# Patient Record
Sex: FEMALE | Race: BLACK OR AFRICAN AMERICAN
[De-identification: names, ages, dates, MRNs, and addresses within clinical notes are randomized per-mention and may not be internally consistent; named-entity substitution may affect disease eponyms.]

---

## 2018-01-26 ENCOUNTER — HOSPITAL ENCOUNTER (OUTPATIENT)
Dept: HOSPITAL 74 - JER | Age: 33
Setting detail: OBSERVATION
LOS: 2 days | Discharge: HOME | End: 2018-01-28
Attending: INTERNAL MEDICINE | Admitting: INTERNAL MEDICINE
Payer: COMMERCIAL

## 2018-01-26 VITALS — BODY MASS INDEX: 36.5 KG/M2
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
## 2018-01-26 DIAGNOSIS — S42.301A: Primary | ICD-10-CM

## 2018-01-26 DIAGNOSIS — R73.03: ICD-10-CM

## 2018-01-26 DIAGNOSIS — E66.9: ICD-10-CM

## 2018-01-26 DIAGNOSIS — D72.829: ICD-10-CM
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
## 2018-01-26 DIAGNOSIS — S00.81XA: ICD-10-CM

## 2018-01-26 DIAGNOSIS — S09.90XA: ICD-10-CM
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
## 2018-01-26 PROCEDURE — G0378 HOSPITAL OBSERVATION PER HR: HCPCS
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
## 2018-01-27 LAB
ALBUMIN SERPL-MCNC: 3.7 G/DL (ref 3.4–5)
ALP SERPL-CCNC: 125 U/L (ref 45–117)
ALT SERPL-CCNC: 26 U/L (ref 12–78)
ANION GAP SERPL CALC-SCNC: 10 MMOL/L (ref 8–16)
APPEARANCE UR: (no result)
AST SERPL-CCNC: 18 U/L (ref 15–37)
BASOPHILS # BLD: 0.2 % (ref 0–2)
BILIRUB SERPL-MCNC: 0.2 MG/DL (ref 0.2–1)
BILIRUB UR STRIP.AUTO-MCNC: NEGATIVE MG/DL
BUN SERPL-MCNC: 16 MG/DL (ref 7–18)
CALCIUM SERPL-MCNC: 8.9 MG/DL (ref 8.5–10.1)
CHLORIDE SERPL-SCNC: 102 MMOL/L (ref 98–107)
CO2 SERPL-SCNC: 26 MMOL/L (ref 21–32)
COLOR UR: (no result)
CREAT SERPL-MCNC: 0.7 MG/DL (ref 0.55–1.02)
DEPRECATED RDW RBC AUTO: 14.4 % (ref 11.6–15.6)
EOSINOPHIL # BLD: 0 % (ref 0–4.5)
EPITH CASTS URNS QL MICRO: (no result) /HPF
GLUCOSE SERPL-MCNC: 110 MG/DL (ref 74–106)
HCT VFR BLD CALC: 39.6 % (ref 32.4–45.2)
HGB BLD-MCNC: 12.5 GM/DL (ref 10.7–15.3)
INR BLD: 0.99 (ref 0.82–1.09)
KETONES UR QL STRIP: NEGATIVE
LEUKOCYTE ESTERASE UR QL STRIP.AUTO: (no result)
LYMPHOCYTES # BLD: 6.3 % (ref 8–40)
MCH RBC QN AUTO: 24.7 PG (ref 25.7–33.7)
MCHC RBC AUTO-ENTMCNC: 31.5 G/DL (ref 32–36)
MCV RBC: 78.4 FL (ref 80–96)
MONOCYTES # BLD AUTO: 3.7 % (ref 3.8–10.2)
MUCOUS THREADS URNS QL MICRO: (no result)
NEUTROPHILS # BLD: 89.8 % (ref 42.8–82.8)
NITRITE UR QL STRIP: NEGATIVE
PH UR: 6 [PH] (ref 5–8)
PLATELET # BLD AUTO: 253 K/MM3 (ref 134–434)
PMV BLD: 10.6 FL (ref 7.5–11.1)
POTASSIUM SERPLBLD-SCNC: 4.2 MMOL/L (ref 3.5–5.1)
PROT SERPL-MCNC: 7.8 G/DL (ref 6.4–8.2)
PROT UR QL STRIP: (no result)
PROT UR QL STRIP: (no result)
PT PNL PPP: 11.2 SEC (ref 9.98–11.88)
RBC # BLD AUTO: 5.06 M/MM3 (ref 3.6–5.2)
RBC # UR STRIP: (no result) /UL
SODIUM SERPL-SCNC: 138 MMOL/L (ref 136–145)
SP GR UR: 1.03 (ref 1–1.03)
UROBILINOGEN UR STRIP-MCNC: NEGATIVE MG/DL (ref 0.2–1)
WBC # BLD AUTO: 12.5 K/MM3 (ref 4–10)

## 2018-01-27 PROCEDURE — 3E0234Z INTRODUCTION OF SERUM, TOXOID AND VACCINE INTO MUSCLE, PERCUTANEOUS APPROACH: ICD-10-PCS | Performed by: INTERNAL MEDICINE

## 2018-01-27 PROCEDURE — 2W38X1Z IMMOBILIZATION OF RIGHT UPPER EXTREMITY USING SPLINT: ICD-10-PCS | Performed by: INTERNAL MEDICINE

## 2018-01-27 RX ADMIN — ACETAMINOPHEN PRN MG: 325 TABLET ORAL at 11:39

## 2018-01-27 RX ADMIN — PANTOPRAZOLE SODIUM SCH MG: 40 TABLET, DELAYED RELEASE ORAL at 17:38

## 2018-01-27 RX ADMIN — CLARITHROMYCIN SCH MG: 500 TABLET ORAL at 22:50

## 2018-01-27 RX ADMIN — AMOXICILLIN SCH MG: 500 CAPSULE ORAL at 22:50

## 2018-01-27 RX ADMIN — ENOXAPARIN SODIUM SCH MG: 40 INJECTION SUBCUTANEOUS at 17:38
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
## 2018-01-27 NOTE — CONSULT
Consult - text type





- Consultation


Consultation Note: 





FULL CONSULT DICTATED





IMp; TRANSVERSE RIGHT HUMERAL SHAFT FX WITH MILD NUMBNESS IN THE RADIAL NERVE 

DISTRIBUTION


PLAN: CLOSED TREATMENT IN SUGAR TONG SPLINT, DC HOME IN AM A=ND F/U MY OFFICE X 

1 WEEK
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
## 2018-01-28 VITALS — SYSTOLIC BLOOD PRESSURE: 105 MMHG | DIASTOLIC BLOOD PRESSURE: 56 MMHG | HEART RATE: 88 BPM

## 2018-01-28 VITALS — TEMPERATURE: 98.5 F

## 2018-01-28 LAB
ANION GAP SERPL CALC-SCNC: 9 MMOL/L (ref 8–16)
APTT BLD: 27 SECONDS (ref 26.9–34.4)
BASOPHILS # BLD: 0.3 % (ref 0–2)
BUN SERPL-MCNC: 13 MG/DL (ref 7–18)
CALCIUM SERPL-MCNC: 8.3 MG/DL (ref 8.5–10.1)
CHLORIDE SERPL-SCNC: 104 MMOL/L (ref 98–107)
CO2 SERPL-SCNC: 26 MMOL/L (ref 21–32)
CREAT SERPL-MCNC: 0.7 MG/DL (ref 0.55–1.02)
DEPRECATED RDW RBC AUTO: 14.5 % (ref 11.6–15.6)
EOSINOPHIL # BLD: 1.4 % (ref 0–4.5)
GLUCOSE SERPL-MCNC: 109 MG/DL (ref 74–106)
HCT VFR BLD CALC: 35 % (ref 32.4–45.2)
HGB BLD-MCNC: 10.8 GM/DL (ref 10.7–15.3)
INR BLD: 1.1 (ref 0.82–1.09)
LYMPHOCYTES # BLD: 28.5 % (ref 8–40)
MCH RBC QN AUTO: 24.2 PG (ref 25.7–33.7)
MCHC RBC AUTO-ENTMCNC: 31 G/DL (ref 32–36)
MCV RBC: 78.2 FL (ref 80–96)
MONOCYTES # BLD AUTO: 7.5 % (ref 3.8–10.2)
NEUTROPHILS # BLD: 62.3 % (ref 42.8–82.8)
PLATELET # BLD AUTO: 231 K/MM3 (ref 134–434)
PMV BLD: 10.4 FL (ref 7.5–11.1)
POTASSIUM SERPLBLD-SCNC: 3.8 MMOL/L (ref 3.5–5.1)
PT PNL PPP: 12.4 SEC (ref 9.98–11.88)
RBC # BLD AUTO: 4.47 M/MM3 (ref 3.6–5.2)
SODIUM SERPL-SCNC: 139 MMOL/L (ref 136–145)
WBC # BLD AUTO: 6.4 K/MM3 (ref 4–10)

## 2018-01-28 RX ADMIN — AMOXICILLIN SCH MG: 500 CAPSULE ORAL at 09:25

## 2018-01-28 RX ADMIN — PANTOPRAZOLE SODIUM SCH MG: 40 TABLET, DELAYED RELEASE ORAL at 09:25

## 2018-01-28 RX ADMIN — CLARITHROMYCIN SCH MG: 500 TABLET ORAL at 09:26

## 2018-01-28 RX ADMIN — ENOXAPARIN SODIUM SCH MG: 40 INJECTION SUBCUTANEOUS at 09:27

## 2018-01-28 RX ADMIN — ACETAMINOPHEN PRN MG: 325 TABLET ORAL at 11:24
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
## 2018-01-28 NOTE — CONS
DATE OF CONSULTATION:  01/27/2018

 

The patient is a 32-year-old female status post a fall, landing on her right arm. 

Negative LOC.  Presents to the emergency room complaining of significant pain and

numbness in her fingers.

 

PAST MEDICAL HISTORY:  Significant for significant obesity and multiple C-sections.

 

PHYSICAL EXAMINATION:  She is in a well-padded sugar-tong splint and a sling.  She

has decreased sensation, especially in the radial distribution, but she does have

good extension of her wrist and of her thumb and of her fingers and good motion

otherwise of elbow, wrist, and fingers.  Neurovascularly intact.

 

X-rays show a transverse right mid to distal 1/3 humeral shaft fracture in acceptable

alignment.  The fracture pattern is mostly transverse with slight comminution.

 

IMPRESSION:  Right transverse mid to distal 1/3 humeral shaft fracture with slight

numbness in the radial nerve distribution, but no motor deficits, as patient is quite

comfortable in the sugar-tong splint.  

 

We will try to electively treat her conservatively.  She can go home and will follow

up in my office in 1 week's time to check progression of the fracture. 

 

 

SITA SENIOR M.D.

 

[id number]

DD: 01/27/2018 22:07

DT: 01/28/2018 07:43

Job #:  [id number]
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
## 2018-04-12 ENCOUNTER — HOSPITAL ENCOUNTER (INPATIENT)
Dept: HOSPITAL 74 - JSAMEDAYSX | Age: 33
LOS: 3 days | Discharge: HOME | DRG: 403 | End: 2018-04-15
Attending: SURGERY | Admitting: SURGERY
Payer: COMMERCIAL

## 2018-04-12 VITALS — BODY MASS INDEX: 38.7 KG/M2

## 2018-04-12 DIAGNOSIS — E66.01: Primary | ICD-10-CM

## 2018-04-12 DIAGNOSIS — R09.02: ICD-10-CM

## 2018-04-12 DIAGNOSIS — E11.9: ICD-10-CM

## 2018-04-12 DIAGNOSIS — R16.0: ICD-10-CM

## 2018-04-12 LAB
ALBUMIN SERPL-MCNC: 3.3 G/DL (ref 3.4–5)
ALP SERPL-CCNC: 148 U/L (ref 45–117)
ALT SERPL-CCNC: 63 U/L (ref 12–78)
ANION GAP SERPL CALC-SCNC: 6 MMOL/L (ref 8–16)
AST SERPL-CCNC: 63 U/L (ref 15–37)
BILIRUB SERPL-MCNC: 0.2 MG/DL (ref 0.2–1)
BUN SERPL-MCNC: 16 MG/DL (ref 7–18)
CALCIUM SERPL-MCNC: 8.1 MG/DL (ref 8.5–10.1)
CHLORIDE SERPL-SCNC: 107 MMOL/L (ref 98–107)
CO2 SERPL-SCNC: 24 MMOL/L (ref 21–32)
CREAT SERPL-MCNC: 0.7 MG/DL (ref 0.55–1.02)
DEPRECATED RDW RBC AUTO: 15.5 % (ref 11.6–15.6)
GLUCOSE SERPL-MCNC: 147 MG/DL (ref 74–106)
HCT VFR BLD CALC: 37.5 % (ref 32.4–45.2)
HGB BLD-MCNC: 11.9 GM/DL (ref 10.7–15.3)
MCH RBC QN AUTO: 24.1 PG (ref 25.7–33.7)
MCHC RBC AUTO-ENTMCNC: 31.6 G/DL (ref 32–36)
MCV RBC: 76.2 FL (ref 80–96)
PLATELET # BLD AUTO: 246 K/MM3 (ref 134–434)
PMV BLD: 9.7 FL (ref 7.5–11.1)
POTASSIUM SERPLBLD-SCNC: 4.4 MMOL/L (ref 3.5–5.1)
PROT SERPL-MCNC: 7.1 G/DL (ref 6.4–8.2)
RBC # BLD AUTO: 4.93 M/MM3 (ref 3.6–5.2)
SODIUM SERPL-SCNC: 137 MMOL/L (ref 136–145)
WBC # BLD AUTO: 7.2 K/MM3 (ref 4–10)

## 2018-04-12 PROCEDURE — 0DB64Z3 EXCISION OF STOMACH, PERCUTANEOUS ENDOSCOPIC APPROACH, VERTICAL: ICD-10-PCS | Performed by: SURGERY

## 2018-04-12 PROCEDURE — 0FB04ZX EXCISION OF LIVER, PERCUTANEOUS ENDOSCOPIC APPROACH, DIAGNOSTIC: ICD-10-PCS | Performed by: SURGERY

## 2018-04-12 PROCEDURE — 0DJ08ZZ INSPECTION OF UPPER INTESTINAL TRACT, VIA NATURAL OR ARTIFICIAL OPENING ENDOSCOPIC: ICD-10-PCS | Performed by: SURGERY

## 2018-04-12 RX ADMIN — ONDANSETRON SCH MG: 2 INJECTION INTRAMUSCULAR; INTRAVENOUS at 19:58

## 2018-04-12 RX ADMIN — METOCLOPRAMIDE SCH MG: 5 INJECTION, SOLUTION INTRAMUSCULAR; INTRAVENOUS at 23:00

## 2018-04-12 RX ADMIN — MORPHINE SULFATE PRN MG: 4 INJECTION, SOLUTION INTRAMUSCULAR; INTRAVENOUS at 16:00

## 2018-04-12 RX ADMIN — ONDANSETRON SCH MG: 2 INJECTION INTRAMUSCULAR; INTRAVENOUS at 16:28

## 2018-04-12 RX ADMIN — MORPHINE SULFATE PRN MG: 4 INJECTION, SOLUTION INTRAMUSCULAR; INTRAVENOUS at 22:20

## 2018-04-12 RX ADMIN — METOCLOPRAMIDE SCH MG: 5 INJECTION, SOLUTION INTRAMUSCULAR; INTRAVENOUS at 13:15

## 2018-04-12 RX ADMIN — FAMOTIDINE SCH MLS: 20 INJECTION, SOLUTION INTRAVENOUS at 12:00

## 2018-04-12 RX ADMIN — ACETAMINOPHEN SCH MG: 10 INJECTION, SOLUTION INTRAVENOUS at 18:50

## 2018-04-12 RX ADMIN — ONDANSETRON SCH MG: 2 INJECTION INTRAMUSCULAR; INTRAVENOUS at 12:45

## 2018-04-12 RX ADMIN — ONDANSETRON SCH MG: 2 INJECTION INTRAMUSCULAR; INTRAVENOUS at 23:00

## 2018-04-12 RX ADMIN — ENOXAPARIN SODIUM SCH MG: 40 INJECTION SUBCUTANEOUS at 22:11

## 2018-04-12 RX ADMIN — ACETAMINOPHEN SCH MG: 10 INJECTION, SOLUTION INTRAVENOUS at 11:50

## 2018-04-12 RX ADMIN — SODIUM CHLORIDE SCH MLS/HR: 9 INJECTION, SOLUTION INTRAVENOUS at 20:01

## 2018-04-12 RX ADMIN — SODIUM CHLORIDE SCH MLS: 9 INJECTION, SOLUTION INTRAVENOUS at 11:30

## 2018-04-12 RX ADMIN — METOCLOPRAMIDE SCH MG: 5 INJECTION, SOLUTION INTRAMUSCULAR; INTRAVENOUS at 18:50

## 2018-04-12 RX ADMIN — ACETAMINOPHEN SCH MG: 10 INJECTION, SOLUTION INTRAVENOUS at 22:59

## 2018-04-12 NOTE — SPEC
DATE OF OPERATION:  04/12/2018

 

SURGEON:  Aylin Patterson MD

 

ASSISTANT:  DAKOTA Moya

 

PREOPERATIVE DIAGNOSES:

1.  Morbid obesity.

2.  Body mass index of 38.8.

3.  Type 2 diabetes.

 

POSTOPERATIVE DIAGNOSES:

1.  Morbid obesity.

2.  Body mass index of 38.8.

3.  Type 2 diabetes.

4.  Hepatomegaly.

 

PROCEDURES:

1.  Laparoscopic vertical sleeve gastrectomy.

2.  Laparoscopic wedge liver biopsy.

3.  Upper endoscopy/esophagogastroduodenoscopy.

 

SPECIMENS:

1.  Greater curvature of the stomach.

2.  Liver biopsy.

 

ESTIMATED BLOOD LOSS:  50 mL

 

DRAINS:  None.

 

ANESTHESIA:  GET.

 

BOUGIE SIZE:  36-[place].

 

REASON FOR PROCEDURE:  This is a 32-year-old female who presented for weight loss

options.  After describing different options, decided to proceed with a laparoscopic,

possible open vertical sleeve gastrectomy.

 

RISKS AND BENEFITS:  After describing the different options for weight loss

management, the patient decided to proceed with a laparoscopic, possible open

vertical sleeve gastrectomy.  The patient was seen by the respective subspecialties

and cleared for surgery.  The risks and benefits of the procedure were explained. 

These included bleeding, infection, hernia, MI, DVT, PE, injury to surrounding

structures including the liver, colon, bowel, spleen, esophagus, vessel injury, nerve

injury, weight regain, gastric leak, staple line leak, sleeve leak, obstruction,

vitamin deficiency, hair loss, and death as some of the possible complications.  The

patient understood and signed informed consent.  

 

DESCRIPTION OF PROCEDURE:  The patient was placed supine on the operating room table.

 The patient underwent general endotracheal intubation.  A Dixon catheter was

inserted.  The arms were brought out at 90 degrees and secured.  A foot board was

placed, and the legs were secured laterally with padding.  The abdomen was prepped

and draped in the usual sterile fashion.  A timeout was performed.

 

An incision was made in the left upper quadrant, and a Veress needle inserted. 

Pneumoperitoneum was established.  Subsequently, the Veress needle was removed, and a

12-mm trocar was placed.  The laparoscopic camera was inserted, and inspection of the

abdominal cavity was performed.  An incision was made in the supraumbilical region,

and a 15-mm trocar placed under direct visualization.  A 5-mm trocar was then placed

in the right upper quadrant, and a 5-mm trocar placed below the left subcostal

margin.  A stab wound was made in the subxiphoid area, and a Sofia clamp inserted and

removed to dilate the tract.  A Earline liver retractor was inserted.  The post was

secured at the bedside by the nursing staff.  The patient was placed in steep reverse

Trendelenburg position.  The Earline liver retractor was used to secure the liver

towards the anterior abdominal wall.  

 

The pylorus was identified and 6 cm proximal to it, the lesser sac was entered using

the Ligasure device.  All lateral attachments to the greater curvature of the stomach

including the short gastric vessels were ligated using the Ligasure device toward the

gastrosplenic and gastrophrenic ligaments.  Once this was done in its entirety, it

was confirmed that all tubes within the nasal or oropharyngeal cavity including a

temperature probe was removed by Anesthesia.  The bougie was then inserted by

Anesthesia.  Transection of the stomach was then begun staying adjacent to the bougie

but away from the angularis.  Transection of the stomach was performed near the

portion of the stomach where the lesser sac was entered.  Two laparoscopic Endo-JAMES

black loads were used at this location.  Laparoscopic Endo-JAMES purple loads were then

used for the remainder of the transection until the greater curvature of the stomach

was fully transected.  This was done staying close to the bougie.  Care was taken to

stay away from the angle of His cephalad.  The staple line was then inspected. 

Hemostasis was identified.  A leak test was then performed.  The stomach was clamped

distally to the staple line.  Irrigation solution was placed in the left upper

quadrant, and air insufflated by Anesthesia into the sleeve.  No leaks were

identified, and no obstruction was identified.  This was done throughout the entirety

of the staple line. At this point, the irrigation solution was suctioned, and again

hemostasis noted.  The 15-mm supraumbilical trocar was then removed, and the specimen

removed from the site using a sponge stick carlson.  The specimen was inspected, and a

Veress needle inserted.  The specimen insufflated adequately, and no leak was

identified.  The staple line was noted to be intact.  A Mani Gilma device was

then used to temporarily close the fascia with a 0 Vicryl suture at this site.  The

15-mm trocar was then reinserted, and the 12-mm trocar in the left upper quadrant

removed.  The fascia at this site was then closed using a Mani Gilma device with

a 0 Vicryl suture.  Again, hemostasis was noted.  The Earline liver retractor was

then removed under direct visualization.  Pneumoperitoneum was desufflated, and the

fascial sutures were secured.  Hemostasis was noted at all incision sites, and

Marcaine was injected at all incision sites.  All incision sites were closed using

4-0 Biosyn.  Sterile dressings were applied.  The patient tolerated the procedure

well, and was transferred to the recovery room in stable condition with the Dixon

catheter intact.  The patient was transferred to telemetry for further monitoring.

 

 

AYLIN PATTERSON M.D.

 

[id number]

DD: 04/12/2018 11:33

DT: 04/12/2018 11:47

Job #:  [id number]

## 2018-04-12 NOTE — HP
History & Physical Update





- History


History: No Change





- Physical


Physical: No Change





- Assessment


Assessment: No Change





- Plan


Plan: No Change (no interval changes since visit with Heraclio Jerome on 3/28/18

)

## 2018-04-12 NOTE — OP
Operative Note





- Note:


Operative Date: 04/12/18


Pre-Operative Diagnosis: Morbid obesity.  Diabetes.  BMI 38.8


Operation: Laparoscopic vertical sleeve gastrectomy, wedge liver biopsy, EGD


Post-Operative Diagnosis: Other (Morbid obesity, BMI 38., diabetes, hepatomegaly

)


Surgeon: Eagle Patterson


Assistant: Jessica Patel


Anesthesia: General


Specimens Removed: Greater curvature of stomach.  Liver biopsy


Estimated Blood Loss (mls): 50


Drains & Tubes with Location: 36 fr Bougie


Operative Report Dictated: Yes

## 2018-04-13 LAB
ALBUMIN SERPL-MCNC: 2.7 G/DL (ref 3.4–5)
ALP SERPL-CCNC: 118 U/L (ref 45–117)
ALT SERPL-CCNC: 70 U/L (ref 12–78)
ANION GAP SERPL CALC-SCNC: 8 MMOL/L (ref 8–16)
AST SERPL-CCNC: 71 U/L (ref 15–37)
BILIRUB SERPL-MCNC: 0.4 MG/DL (ref 0.2–1)
BUN SERPL-MCNC: 8 MG/DL (ref 7–18)
CALCIUM SERPL-MCNC: 7.5 MG/DL (ref 8.5–10.1)
CHLORIDE SERPL-SCNC: 107 MMOL/L (ref 98–107)
CO2 SERPL-SCNC: 24 MMOL/L (ref 21–32)
CREAT SERPL-MCNC: 0.5 MG/DL (ref 0.55–1.02)
DEPRECATED RDW RBC AUTO: 15.6 % (ref 11.6–15.6)
GLUCOSE SERPL-MCNC: 117 MG/DL (ref 74–106)
HCT VFR BLD CALC: 32.8 % (ref 32.4–45.2)
HGB BLD-MCNC: 10.7 GM/DL (ref 10.7–15.3)
MCH RBC QN AUTO: 24.6 PG (ref 25.7–33.7)
MCHC RBC AUTO-ENTMCNC: 32.5 G/DL (ref 32–36)
MCV RBC: 75.6 FL (ref 80–96)
PLATELET # BLD AUTO: 242 K/MM3 (ref 134–434)
PMV BLD: 10.2 FL (ref 7.5–11.1)
POTASSIUM SERPLBLD-SCNC: 3.8 MMOL/L (ref 3.5–5.1)
PROT SERPL-MCNC: 6.2 G/DL (ref 6.4–8.2)
RBC # BLD AUTO: 4.34 M/MM3 (ref 3.6–5.2)
SODIUM SERPL-SCNC: 139 MMOL/L (ref 136–145)
WBC # BLD AUTO: 10.8 K/MM3 (ref 4–10)

## 2018-04-13 RX ADMIN — ONDANSETRON SCH MG: 2 INJECTION INTRAMUSCULAR; INTRAVENOUS at 20:02

## 2018-04-13 RX ADMIN — FAMOTIDINE SCH MLS/HR: 20 INJECTION, SOLUTION INTRAVENOUS at 10:12

## 2018-04-13 RX ADMIN — FAMOTIDINE SCH MLS/HR: 20 INJECTION, SOLUTION INTRAVENOUS at 21:25

## 2018-04-13 RX ADMIN — ONDANSETRON SCH MG: 2 INJECTION INTRAMUSCULAR; INTRAVENOUS at 12:00

## 2018-04-13 RX ADMIN — METOCLOPRAMIDE SCH MG: 5 INJECTION, SOLUTION INTRAMUSCULAR; INTRAVENOUS at 05:07

## 2018-04-13 RX ADMIN — ENOXAPARIN SODIUM SCH MG: 40 INJECTION SUBCUTANEOUS at 10:13

## 2018-04-13 RX ADMIN — ONDANSETRON SCH MG: 2 INJECTION INTRAMUSCULAR; INTRAVENOUS at 15:21

## 2018-04-13 RX ADMIN — ENOXAPARIN SODIUM SCH MG: 40 INJECTION SUBCUTANEOUS at 22:23

## 2018-04-13 RX ADMIN — SODIUM CHLORIDE SCH MLS/HR: 9 INJECTION, SOLUTION INTRAVENOUS at 15:21

## 2018-04-13 RX ADMIN — SODIUM CHLORIDE SCH MLS/HR: 9 INJECTION, SOLUTION INTRAVENOUS at 02:26

## 2018-04-13 RX ADMIN — METOCLOPRAMIDE SCH MG: 5 INJECTION, SOLUTION INTRAMUSCULAR; INTRAVENOUS at 17:59

## 2018-04-13 RX ADMIN — SODIUM CHLORIDE SCH MLS/HR: 9 INJECTION, SOLUTION INTRAVENOUS at 21:26

## 2018-04-13 RX ADMIN — SODIUM CHLORIDE SCH: 9 INJECTION, SOLUTION INTRAVENOUS at 12:00

## 2018-04-13 RX ADMIN — METOCLOPRAMIDE SCH MG: 5 INJECTION, SOLUTION INTRAMUSCULAR; INTRAVENOUS at 12:00

## 2018-04-13 RX ADMIN — ONDANSETRON SCH MG: 2 INJECTION INTRAMUSCULAR; INTRAVENOUS at 03:18

## 2018-04-13 RX ADMIN — SODIUM CHLORIDE SCH MLS/HR: 9 INJECTION, SOLUTION INTRAVENOUS at 10:11

## 2018-04-13 RX ADMIN — ONDANSETRON SCH MG: 2 INJECTION INTRAMUSCULAR; INTRAVENOUS at 06:49

## 2018-04-13 RX ADMIN — ACETAMINOPHEN SCH MG: 10 INJECTION, SOLUTION INTRAVENOUS at 05:08

## 2018-04-13 NOTE — PATH
Surgical Pathology Report



Patient Name:  DEVANTE CARR

Accession #:  [id number]

[facility]. Rec. #:  [id number]                                                        

   /Age/Gender:  1985 (Age: 32) / F

Account:  [id number]                                                          

             Location: 4 SO PEDS/ADOL

Taken:  2018

Received:  2018

Reported:  2018

Physicians:  Eagle Patterson M.D.

  



Specimen(s) Received

A: BX LIVER 

B: GREATER CURVATURE STOMACH 





Clinical History

Morbid obesity







Final Diagnosis

A. LIVER, BIOPSY:

LIVER PARENCHYMA WITH MILD STEATOSIS (< 5%).

NO INCREASE IN IRON AND FIBROSIS ON PERFORMED SPECIAL STAINS (IRON AND

TRICHROME).



B. STOMACH, GREATER CURVATURE, LAPAROSCOPIC VERTICAL SLEEVE GASTRECTOMY:

PORTION OF STOMACH WITH MILD CHRONIC GASTRITIS. IMMUNOHISTOCHEMICAL STAIN FOR H.

PYLORI IS NEGATIVE.





***Electronically Signed***

Korin Kimball M.D.





Gross Description

A. Received in formalin, labeled" liver biopsy" is a tan liver tissue with

cautery artifact measuring 2 x 1 x 0.3 cm. Representative sections are submitted

in one cassette.



B. Received in formalin, labeled "greater curvature of stomach," is a 17 x 4 x 2

cm. portion of stomach with a stapled margin of resection. The serosa is

tan-gray with minimal attached fat. The mucosa is tan-pink with normal folds. No

mucosal masses are identified. Representative sections are submitted in one

cassette.





luis/[email or web address]

## 2018-04-13 NOTE — PN
Progress Note (short form)





- Note


Progress Note: 





POD #1





Alert. Doing well. C/o mild incisional tenderness. Adequate pain control via 

prn meds. Voiding spontaneously. 


Denies n/v/f/c, CP or SOB.





AVSS. Afebrile





Gen: alert. nad.


ABD: obese. All surgical ports intact. No hematoma.


LE: soft. NT. Bilat





UGI: no extravastion, leak or outlet obstruction.





<Jacinto Gonzalez - Last Filed: 04/13/18 12:48>





- Note


Progress Note: 





Agree


POD 1


Pain controlled


 Vital Signs











 Period  Temp  Pulse  Resp  BP Sys/Davies  Pulse Ox


 


 Last 24 Hr  97.7 F-98.2 F  74-99  16-25  108-127/68-85  92-95








Abd soft





 CBC, BMP





 04/13/18 06:00 





 04/13/18 06:00 





UGI: no leak/obstruction





Clears


Ambulate





<Eagle Patterson - Last Filed: 04/13/18 19:20>





Problem List





- Problems


(1) Morbid (severe) obesity due to excess calories


Assessment/Plan: 


POD #1 s/p Laparoscopic sleeve gastrectomy, wedge liver bx intra-op egd





1. Start Bariatric stage 1 diet


2. OOB and ambulate


3. Pain management prn


4. GI ppx


5. DVT ppx


6. dc planning in AM


Code(s): E66.01 - MORBID (SEVERE) OBESITY DUE TO EXCESS CALORIES   





<Jacinto Gonzalez P - Last Filed: 04/13/18 12:48>

## 2018-04-13 NOTE — PN
Progress Note (short form)





- Note


Progress Note: 





Post op day#1.S/p Laproscopic gastric sleeve placement under GA 

uneventful.Patient stable.No any anesthesia related problem.Patient DC from the 

anesthesia care.

## 2018-04-14 RX ADMIN — ONDANSETRON SCH MG: 2 INJECTION INTRAMUSCULAR; INTRAVENOUS at 00:26

## 2018-04-14 RX ADMIN — ENOXAPARIN SODIUM SCH MG: 40 INJECTION SUBCUTANEOUS at 10:53

## 2018-04-14 RX ADMIN — ONDANSETRON SCH MG: 2 INJECTION INTRAMUSCULAR; INTRAVENOUS at 08:38

## 2018-04-14 RX ADMIN — ONDANSETRON SCH MG: 2 INJECTION INTRAMUSCULAR; INTRAVENOUS at 03:43

## 2018-04-14 RX ADMIN — ONDANSETRON SCH MG: 2 INJECTION INTRAMUSCULAR; INTRAVENOUS at 11:58

## 2018-04-14 RX ADMIN — METOCLOPRAMIDE SCH MG: 5 INJECTION, SOLUTION INTRAMUSCULAR; INTRAVENOUS at 17:57

## 2018-04-14 RX ADMIN — FAMOTIDINE SCH MLS/HR: 20 INJECTION, SOLUTION INTRAVENOUS at 10:53

## 2018-04-14 RX ADMIN — METOCLOPRAMIDE SCH MG: 5 INJECTION, SOLUTION INTRAMUSCULAR; INTRAVENOUS at 11:58

## 2018-04-14 RX ADMIN — FAMOTIDINE SCH MLS/HR: 20 INJECTION, SOLUTION INTRAVENOUS at 21:59

## 2018-04-14 RX ADMIN — METOCLOPRAMIDE SCH MG: 5 INJECTION, SOLUTION INTRAMUSCULAR; INTRAVENOUS at 05:28

## 2018-04-14 RX ADMIN — ONDANSETRON SCH MG: 2 INJECTION INTRAMUSCULAR; INTRAVENOUS at 16:17

## 2018-04-14 RX ADMIN — ENOXAPARIN SODIUM SCH MG: 40 INJECTION SUBCUTANEOUS at 21:59

## 2018-04-14 RX ADMIN — SODIUM CHLORIDE SCH MLS/HR: 9 INJECTION, SOLUTION INTRAVENOUS at 16:16

## 2018-04-14 RX ADMIN — ONDANSETRON SCH MG: 2 INJECTION INTRAMUSCULAR; INTRAVENOUS at 19:59

## 2018-04-14 RX ADMIN — METOCLOPRAMIDE SCH MG: 5 INJECTION, SOLUTION INTRAMUSCULAR; INTRAVENOUS at 00:26

## 2018-04-14 NOTE — CON.PULM
Consult


Consult Specialty:: PULM/CCM 


Referred by:: Surgery 


Reason for Consultation:: SOB / Hypoxemia





- History of Present Illness


Chief Complaint: S/P SLeeve


History of Present Illness: 


 32 F, morbid obesity, no OSAS by NPSG on 2/2018, and DM.  POD #1 from a 

laparoscopic vertical sleeve gastrectomy, wedge liver biopsy, and EGD. 


No efren-operative complications noted. 


Patient noted to be hypoxic to the mid 80's on RA.  Saturation 95% on 2 L NC O2.





She is awake and alert and in NAD.  





She denies CP or SOB.  





No cough or sputum production. 





Very mild, appropriate abdominal discomfort to palpation. 








- History Source


History Provided By: Patient


Limitations to Obtaining History: Language Barrier





- Past Medical History


...LMP: 03/15/18





- Alcohol/Substance Use


Hx Alcohol Use: Yes (occas)





- Smoking History


Smoking history: Never smoked


Have you smoked in the past 12 months: No





Home Medications





- Allergies


Allergies/Adverse Reactions: 


 Allergies











Allergy/AdvReac Type Severity Reaction Status Date / Time


 


No Known Allergies Allergy   Verified 04/11/18 15:44














- Home Medications


Home Medications: 


Ambulatory Orders





Famotidine [Pepcid] 20 mg PO BID #60 tablet 04/12/18 


Oxycodone HCl/Acetaminophen [Percocet 5-325 mg Tablet] 1 - 2 tab PO Q6H #28 tab 

MDD 4 04/12/18 











Review of Systems





- Review of Systems


Constitutional: denies: Chills, Fever, Loss of Appetite, Malaise, Night Sweats, 

Weakness


Eyes: reports: No Symptoms


HENT: reports: No Symptoms


Neck: reports: No Symptoms


Cardiovascular: reports: No Symptoms


Respiratory: reports: No Symptoms


Gastrointestinal: reports: No Symptoms


Genitourinary: reports: No Symptoms


Breasts: reports: No Symptoms Reported


Musculoskeletal: reports: No Symptoms


Integumentary: reports: No Symptoms


Neurological: reports: No Symptoms


Endocrine: reports: No Symptoms


Hematology/Lymphatic: reports: No Symptoms


Psychiatric: reports: No Symptoms





Physical Exam


Vital Sings: 


 Vital Signs











Temperature  99.9 F H  04/14/18 13:20


 


Pulse Rate  97 H  04/14/18 13:20


 


Respiratory Rate  20   04/14/18 13:20


 


Blood Pressure  128/67   04/14/18 13:20


 


O2 Sat by Pulse Oximetry (%)  95   04/14/18 04:14











Constitutional: Yes: No Distress, Calm, Obese


Eyes: Yes: Conjunctiva Clear, EOM Intact


HENT: Yes: Atraumatic, Normocephalic, Other


Neck: Yes: Supple


Cardiovascular: Yes: Regular Rate and Rhythm


Respiratory: Yes: CTA Bilaterally, Diminished, On Nasal O2.  No: Accessory 

Muscle Use, Cough, Orthopnea, Rales, Rhonchi, SOB, SOB on Exertion, Stridor, 

Tachypnea, Wheezes


...Inspection: Yes: WNL


...Clubbing: No


Gastrointestinal: Yes: Normal Bowel Sounds, Soft, Abdomen, Obese, Tenderness.  

No: Melena, Pulsatile Mass, Vomiting


Renal/: Yes: WNL


Musculoskeletal: Yes: WNL


Extremities: Yes: WNL


Edema: No


Peripheral Pulses WNL: Yes


Integumentary: Yes: WNL


Neurological: Yes: WNL, Alert, Oriented


...Motor Strength: WNL


Psychiatric: Yes: WNL, Alert, Oriented


Labs: 


 CBC, BMP





 04/13/18 06:00 





 04/13/18 06:00 











Problem List





- Problems


(1) Hypoxemia


Code(s): R09.02 - HYPOXEMIA   





(2) Hepatomegaly


Code(s): R16.0 - HEPATOMEGALY, NOT ELSEWHERE CLASSIFIED   





(3) Morbid (severe) obesity due to excess calories


Code(s): E66.01 - MORBID (SEVERE) OBESITY DUE TO EXCESS CALORIES   





Assessment/Plan


Suspected hypoxemia due to atelectasis (normal saturation on NPSG) 


STAT CXR 


O2 supplementation 


No ABX indicated 


Incentive Spirometry: I suspect that after this her saturations will be 

improved and there is no Pulmonary contraindication for D/C 


Do not suspect more significant pathology due to the paucity of symptoms. 


Will follow 





Dr Spain

## 2018-04-15 VITALS — TEMPERATURE: 98.4 F | DIASTOLIC BLOOD PRESSURE: 90 MMHG | HEART RATE: 101 BPM | SYSTOLIC BLOOD PRESSURE: 127 MMHG

## 2018-04-15 RX ADMIN — METOCLOPRAMIDE SCH MG: 5 INJECTION, SOLUTION INTRAMUSCULAR; INTRAVENOUS at 00:13

## 2018-04-15 RX ADMIN — FAMOTIDINE SCH MLS/HR: 20 INJECTION, SOLUTION INTRAVENOUS at 09:40

## 2018-04-15 RX ADMIN — ONDANSETRON SCH MG: 2 INJECTION INTRAMUSCULAR; INTRAVENOUS at 08:19

## 2018-04-15 RX ADMIN — ONDANSETRON SCH MG: 2 INJECTION INTRAMUSCULAR; INTRAVENOUS at 00:13

## 2018-04-15 RX ADMIN — ONDANSETRON SCH MG: 2 INJECTION INTRAMUSCULAR; INTRAVENOUS at 04:21

## 2018-04-15 RX ADMIN — ENOXAPARIN SODIUM SCH MG: 40 INJECTION SUBCUTANEOUS at 09:40

## 2018-04-15 RX ADMIN — METOCLOPRAMIDE SCH MG: 5 INJECTION, SOLUTION INTRAMUSCULAR; INTRAVENOUS at 05:27

## 2018-04-15 NOTE — PN
Progress Note (short form)





- Note


Progress Note: 





Kept for low oxygen saturation


Pulmonary consulted


CXR shows atelectasis


Using incentive spirometer


 Vital Signs











 Period  Temp  Pulse  Resp  BP Sys/Davies  Pulse Ox


 


 Last 24 Hr  98.6 F-100 F    20-20  122-140/67-82  86-88








Abd soft





Tolerating clears


Explained the importance of ambulation and incentive spirometer


Oxygen saturation improved


Cleared by pulmonary


Discharge home

## 2023-10-30 NOTE — SURG
PRE-SEDATION ASSESSMENT    CONSENT  Risks, benefits, and alternatives have been discussed with the patient/patient representative, and patient/patient representative agrees to proceed: Yes    MEDICAL HISTORY    Continues to have LBP which is worse with standing and walking. Pain was reproduced today with extension and hyperextension of the lumbar spine.      PHYSICAL EXAM  History and Physical Reviewed: H&P completed today  Airway Anatomy : Class III  Heart : Normal  Lungs : Normal  LOC/Mental Status : Normal    OTHER FINDINGS  Reviewed current medications and allergies: Yes  Pertinent lab/diagnostic test reviewed: Yes    SEDATION RISK ASSESSMENT  Risk Status ASA: Class II - Normal patient with mild systemic disease  Plan for Sedation: Moderate Sedation  EKG Monitoring: No  Lumbar facet arthropathy/MBB/RFA  NARRATIVE FINDINGS      Surgery First Assist Note


First Assist: Jessica Patel PA-C


Date of Service: 04/12/18


Diagnosis: 





morbid obesity, diabetes, hepatomegaly


Procedure: 


Laparoscopic vertical sleeve gastrectomy, wedge liver biopsy, EGD





I was present for the entirety of the operative procedure. For further detail, 

please refer to operative report.








Visit type





- Case Type


Case Type: Scheduled Admission





- Emergency


Emergency Visit: No





- New patient


This patient is new to me today: Yes


Date on this admission: 04/12/18